# Patient Record
(demographics unavailable — no encounter records)

---

## 2024-10-21 NOTE — HISTORY OF PRESENT ILLNESS
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  Reports numbness of LUE and L face resolved within seconds and that speech was back to baseline  after arrival to the ED. On the week prior to admission her platelets were 56.  Reports had similar symptoms last month and told she had TIA.  Reports chewed on an aspirin at that point prior to arrival. Endorses echymoses on lower extremities, on left arm and subconjuctival hemorrhage, but denies any other bleeding (urine, vaginal spotting, stool) om the time of hospital presentation. Denies any chest pain, shortness of breath, limb pain. Denies any fevers, chills, night sweats, lymphadenopathy, abdominal pain / swelling, nausea, vomiting, changes in appetite. Denies starting any new medications; prior to Jun 29 2019 (for first TIA as above) was not taking any medications regularly, but was started on ASA and plavix due to previous TIA. When platelets had dropped, cardiologist d/c'd plavix and continued with low dose ASA. Denies any changes in urinary pattern, no flank pain, no hematuria; no recent diarrhea / gastroenteritis.  Patient initially was not diagnosed with TTP, as she had normal appearing RBCs without schistocytes and large platelets on smear,, and her hemoglobin was normal, however further workup revealed that there was evidence of hemolytic anemia as her LDH was high and haptoglobin was low, and eventually PUIPNH26 level came back at 3.1% with high antibody level of 61 . She received 1 mg/kg of prednisone. Started plasma exchange on 8/9/19 with quick recovery of platelets, last day of PLEX was 8/15/19, total of 5 sessions. She is being tapered off of her prednisone on a quick schedule (10 mg every 3 days). Repeat OHQKGW19 was 68%.  She has now completed 4 doses of Rituxan. Her platelets were trending down and her LDH was trending up, so extended her steroid taper. Her repeat TESGFU66 on 9/3/19 was 63.5%. Her platelets and LDH have trended back in the right direction. On repeat check patient's HCFQIW28 was 39.5% on 9/24/19. On subsequent measurement 10/24/19 RLXBRW64 was 68%. On routine surveillance, on 4/23/2020 ESPIRK93 was down to 2%, although all hematologic parameters did not show evidence of any hemolysis and patient was asymptomatic. On 5/4/2020 patient received additional dose Rituxan. Repeat YKMBZA51 on 6/1/2020 showed NATTCI80 level of 88%. Levels have been followed since then and have been stable. Most recently on Jan 22 2021 ADAMTS 13 level was 95%.   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 She ended up with a paravalvular leak which they repaired. They felt she was too high risk for sternotomy so they went through the groin. Hematologist Dr. Doyle saw her when she was there and recommended that she avoid Plavix, she is on Eliquis for three months, (May 17th was the surgery, August 17th will be 3 months). No pain, and her shortness of breath is completely resolved.   Of note- patient had a small R parietal infarction in January 2023. presenting to the ED w/ left sided facial numbness and LUE tingling/numbness. This only lasted for a few seconds. Her  noticed a facial droop. Labs significant for , which downtrended. CT head showed chronic infarcts, no acute pathology. CTA/CTV of the head negative. MRI brain showed acute/subacute infarct in R parietal lobe. TTE with bubble study w/o PFO or concerning features. Of note, patient with persistently normal platelet levels, normal hemoglobin, and YWOWHL77 in the hospital was 100%.   [FreeTextEntry1] : Completed 4 doses of Rituxan, last dose 9/13/2019\par  1 additional dose Rituxan 5/4/2020 [de-identified] : Patient seen for follow up on 10/21/2024. Back on metoprolol as she did have tachycardic episode on a stressful day (no afib). Otherwise, Patient with no complaints today. Reports no febrile illnesses. No weight loss or night sweats. No chest pain, palpitations or shortness of breath. Denies any N/V/D. No pain symptoms at the present time. Normal bowel movements and normal urinary habits. Patient reports a good appetite at this time.

## 2024-10-21 NOTE — PHYSICAL EXAM
[Fully active, able to carry on all pre-disease performance without restriction] : Status 0 - Fully active, able to carry on all pre-disease performance without restriction [Obese] : obese [Normal] : affect appropriate [de-identified] : supple [de-identified] : (+)S1S2 RRR [de-identified] : no edema

## 2024-10-21 NOTE — ASSESSMENT
[FreeTextEntry1] : 63 yo F with history of TTP in 2001 in setting of CVA without residual deficit require 38 PLEX sessions at that time, with complication of BE s/p 2 valve replacements now with recurrent TTP in the setting of similar CVA with resolved neurologic symptoms and s/p 5 sessions PLEX + corticosteroids, and now s/p 4 rituximab treatments ending in September 2019 with good response, with dropping TFRDVI02 level in April 2020 s/p additional Rituxan in May 2020 and now here for follow up.  Patient s/p hospitalization for failure of porcine valve placed close to 20 years ago, s/p replacement with bovine valve with repair of paravalvular leak on 5/17/22. Heart failure symptoms resolved. Off anticoagulation. SNVIII51 has been normal, but had one isolated lower than normal in the setting of her surgery. At this point it has stabilized and has been close to 100% Recurrent CVA in January 2023 - transient symptoms and TJTOCN46 showing 100% and hemoglobin and platelets are stable and normal the entire time- very unlikely related to TTP. CBC continues to remain stable and YCXDEZ64 level 100%.  Plan: -Patient is stable from a symptom standpoint. -CBC normal in the office today, monitoring for recurrence of TTP. -Continue to monitor hemolytic parameters. These have continued to stay normal. Also checking [id number] -Patient to continue aspirin daily. -RTC annually and repeat labs q6months.

## 2024-10-21 NOTE — REVIEW OF SYSTEMS
[Joint Pain] : joint pain [Negative] : Heme/Lymph [Vision Problems] : no vision problems [Dysphagia] : no dysphagia [Nosebleeds] : no nosebleeds [Odynophagia] : no odynophagia [Chest Pain] : no chest pain [Palpitations] : no palpitations [Lower Ext Edema] : no lower extremity edema [Dysuria] : no dysuria [Dysmenorrhea/Abn Vaginal Bleeding] : no dysmenorrhea/abnormal vaginal bleeding [Muscle Pain] : no muscle pain [Insomnia] : no insomnia [Anxiety] : no anxiety [Hot Flashes] : no hot flashes [FreeTextEntry9] : bilateral knee pain